# Patient Record
Sex: MALE | Race: WHITE | NOT HISPANIC OR LATINO | URBAN - METROPOLITAN AREA
[De-identification: names, ages, dates, MRNs, and addresses within clinical notes are randomized per-mention and may not be internally consistent; named-entity substitution may affect disease eponyms.]

---

## 2018-01-07 ENCOUNTER — EMERGENCY (EMERGENCY)
Facility: HOSPITAL | Age: 28
LOS: 1 days | Discharge: HOME | End: 2018-01-07
Attending: EMERGENCY MEDICINE | Admitting: EMERGENCY MEDICINE
Payer: MEDICAID

## 2018-01-07 VITALS
TEMPERATURE: 98 F | HEART RATE: 101 BPM | SYSTOLIC BLOOD PRESSURE: 154 MMHG | RESPIRATION RATE: 18 BRPM | DIASTOLIC BLOOD PRESSURE: 109 MMHG | OXYGEN SATURATION: 99 %

## 2018-01-07 PROCEDURE — 90792 PSYCH DIAG EVAL W/MED SRVCS: CPT | Mod: GC

## 2018-01-07 PROCEDURE — 99284 EMERGENCY DEPT VISIT MOD MDM: CPT

## 2018-01-07 NOTE — ED BEHAVIORAL HEALTH ASSESSMENT NOTE - NS ED [facility] BILLING ATTENDING W NP SUPERVISION ATTESTATION
09/10/19    Zahra Lawton      To Whom It May Concern:     This letter has been written at the patient's request. The above patient was seen at [facility] for treatment of a medical condition from 09/08-09/10    The patient may return to work/school o Agree

## 2018-01-07 NOTE — ED BEHAVIORAL HEALTH ASSESSMENT NOTE - REFERRAL / APPOINTMENT DETAILS
Follow up at scheduled [facility] appointment. If patient cannot make appointment, go to Crisis Center (information given to patient and mother)

## 2018-01-07 NOTE — ED ADULT NURSE REASSESSMENT NOTE - NS ED NURSE REASSESS COMMENT FT1
Medical clearance complete, pt in improved and stable condition, discharged as per MD Trejo order, discharge instructions given, pt verbalized understanding and left ER a&ox3 with family.

## 2018-01-07 NOTE — ED BEHAVIORAL HEALTH ASSESSMENT NOTE - SAFETY PLAN DETAILS
If you have any thoughts of harming yourself or others, or your symptoms worsen, call 911 or go to the nearest ER immediately.

## 2018-01-07 NOTE — ED PROVIDER NOTE - ATTENDING CONTRIBUTION TO CARE
27 year old with schizophrenia with inreasing symptoms.  med non complinace. cleared medically. to be seen in  no si hi. no ingestions. patient calm and cooperative

## 2018-01-07 NOTE — ED PROVIDER NOTE - NSTIMEPROVIDERCAREINITIATE_GEN_ER
pt sent from [facility] staff for aggression towards staff today
07-Jan-2018 11:46

## 2018-01-07 NOTE — ED PROVIDER NOTE - OBJECTIVE STATEMENT
27 year old male bibems after mother called because patient was sleeping in car, afraid to come in house for past three days. has been missing appointments.  ran out of meds (olanzipine).  no si hi no current somatic complaints

## 2018-01-07 NOTE — ED BEHAVIORAL HEALTH ASSESSMENT NOTE - DETAILS
blurry vision and constipation on risperdal Per EMR, mother with bipolar disorder Discussed with patient and mother

## 2018-01-07 NOTE — ED ADULT TRIAGE NOTE - CHIEF COMPLAINT QUOTE
pt BIBA from home, pt keeps missing appointments for psychiatrist and is non-compliant with his meds.

## 2018-01-07 NOTE — ED BEHAVIORAL HEALTH ASSESSMENT NOTE - SUMMARY
27 year old single  male, domiciled with mother, non-caregiver, on disability, with a PPHx of schizophrenia, history of 2 prior psychiatric admissions (first in 2005, most recent 8/5/15-10/14/15 at [address] [place] [address] for psychosis), no prior suicide attempts or self-injurious behavior, no history of violence/arrests, denying active substance abuse, no history of rehab/detox, no history of complicated withdrawals (no seizure, DTs, ICU admissions), BIB EMS, activated by mother, for non-compliance with medication. Patient has been off Zyprexa 10 mg QHS for 2 weeks, but patient denies any mood symptoms or psychotic symptoms, and per mother has been in good behavioral control, has not observed return of his psychotic symptoms. Patient is calm, cooperative, and pleasant throughout interview, is agreeable to outpatient follow up, reports that he has an appointment at the [facility]. Discussed plan with patient and mother, including to go to Crisis Center as a backup if patient cannot make his [facility] appointment.

## 2018-01-07 NOTE — ED BEHAVIORAL HEALTH ASSESSMENT NOTE - CASE SUMMARY
27 year old single  male, domiciled with mother, non-caregiver, on disability, with a PPHx of schizophrenia, history of 2 prior psychiatric admissions (first in 2005, most recent 8/5/15-10/14/15 at [address] [place] [address] for psychosis), no prior suicide attempts or self-injurious behavior, no history of violence/arrests, denying active substance abuse, no history of rehab/detox, no history of complicated withdrawals (no seizure, DTs, ICU admissions), BIB EMS, activated by mother, for non-compliance with medication. Patient has been off Zyprexa 10 mg QHS for 2 weeks, but patient denies any mood symptoms or psychotic symptoms, and per mother has been in good behavioral control, has not observed return of his psychotic symptoms. Patient is calm, cooperative, and pleasant throughout interview, is agreeable to outpatient follow up, reports that he has an appointment at the [facility]. Discussed plan with patient and mother, including to go to Crisis Center as a backup if patient cannot make his [facility] appointment.  Agree with the assessment and plan as outline in resident note. Safety plan was discussed and agreed upon w/ pt and mother.   Pt was offered VOL admission which he didn't want and does not meet legal criteria for 9.39 admission at this time.

## 2018-01-07 NOTE — ED BEHAVIORAL HEALTH ASSESSMENT NOTE - HPI (INCLUDE ILLNESS QUALITY, SEVERITY, DURATION, TIMING, CONTEXT, MODIFYING FACTORS, ASSOCIATED SIGNS AND SYMPTOMS)
The patient is a 27 year old single  male, domiciled with mother, non-caregiver, on disability, with a PPHx of schizophrenia, history of 2 prior psychiatric admissions (first in 2005, most recent 8/5/15-10/14/15 at [address] [place] [address] for psychosis), no prior suicide attempts or self-injurious behavior, no history of violence/arrests, denying active substance abuse, no history of rehab/detox, no history of complicated withdrawals (no seizure, DTs, ICU admissions), BIB EMS, activated by mother, for non-compliance with medication. On interview, the patient reports he does not know why his mother activated EMS, but says he has been off his medications (Zyprexa 10 mg QHS) for 2 weeks because his last psychiatrist, Dr. An, closed his case. The patient reports that he has called [facility] to make an intake appointment, and per CV there is a new [facility] pre-registration episode created in January, says he believes his appointment is scheduled for tomorrow. The patient denies any mood symptoms or psychotic symptoms, says his mood has been "good", has been sleeping about 7 hours/night, denies anergia, denies poor concentration, denies poor appetite, denies SI/HI, denies AH/VH, denies TI/TB/TC, denies paranoid ideas, denies IOR. The patient denies a history of manic episodes (no distinct period of persistently elevated or irritable mood with decreased need for sleep). No history of physical or sexual trauma. Patient is calm, cooperative, and pleasant throughout interview, appears somewhat confused at times about some details of his recent history, but otherwise appears to be at baseline.    Collateral obtained from patient’s mother, who confirms that patient has been out of medications for the past 2 weeks. She reports some changes in his behavior over the past few weeks, including sleeping less, staying over at a friend's house overnight a few nights in a row, eating less, not attending to some chores at home (walking the dog, making his bed), but otherwise at baseline, denies any observed psychotic symptoms, does not appear paranoid. Comparing current presentation to his symptoms prior to admission to [facility] in 2015, she reports that he is not nearly as bad now, but fears that without medication he will relapse. She feels comfortable with the patient going home today and following up in the outpatient clinic.

## 2018-01-07 NOTE — ED ADULT NURSE NOTE - OBJECTIVE STATEMENT
[facility] - mother states that pt. has been missing appointments with psychiatrist for the past couple of months and sleeping in car for the past 3 days because, per pt, was afraid to come into the house. VSS on RA. Patient denies chest pain, SOB, N/V, fever, chills, HI/SI. NAD noted, pt. calm and cooperative at this time - will continue to monitor.

## 2018-01-07 NOTE — ED BEHAVIORAL HEALTH ASSESSMENT NOTE - RISK ASSESSMENT
The patient has a low to moderate baseline risk of self-harm due to an underlying psychotic disorder. Other static risk factors include history of substance abuse (denying active use). Patient has been off medications for 2 weeks, but desires to resume them, is seeking outpatient care. Protective factors include support from mother, future orientation, no history of suicidal behavior, denying access to guns at home. While the patient does have a chronic risk of self-harm, the patient is not at an acutely elevated risk, and this risk cannot be further mitigated by psychiatric admission.

## 2018-01-07 NOTE — ED ADULT NURSE REASSESSMENT NOTE - NS ED NURSE REASSESS COMMENT FT1
13:40-Patient in improved and stable condition, discharged as per MD Trejo order, discharge instructions given, pt verbalized understanding and left ER a&ox3

## 2018-01-07 NOTE — ED BEHAVIORAL HEALTH ASSESSMENT NOTE - DESCRIPTION
Calm, cooperative, pleasant.  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 On disability currently.

## 2020-12-06 NOTE — ED ADULT NURSE NOTE - HARM RISK FACTORS
I have reviewed discharge instructions with the patient. The patient verbalized understanding. Patient left ED via Discharge Method: ambulatory to Home with self. Opportunity for questions and clarification provided. Patient given 0 scripts. To continue your aftercare when you leave the hospital, you may receive an automated call from our care team to check in on how you are doing. This is a free service and part of our promise to provide the best care and service to meet your aftercare needs.  If you have questions, or wish to unsubscribe from this service please call [phone]. Thank you for Choosing our [facility] Emergency Department.
no